# Patient Record
Sex: MALE
[De-identification: names, ages, dates, MRNs, and addresses within clinical notes are randomized per-mention and may not be internally consistent; named-entity substitution may affect disease eponyms.]

---

## 2017-07-14 ENCOUNTER — HOSPITAL ENCOUNTER (OUTPATIENT)
Dept: HOSPITAL 76 - LAB.N | Age: 37
Discharge: HOME | End: 2017-07-14
Attending: NURSE PRACTITIONER
Payer: COMMERCIAL

## 2017-07-14 DIAGNOSIS — N52.9: ICD-10-CM

## 2017-07-14 DIAGNOSIS — R68.82: ICD-10-CM

## 2017-07-14 DIAGNOSIS — E78.5: Primary | ICD-10-CM

## 2017-07-14 LAB
CHOLEST SERPL-MCNC: 172 MG/DL
HDLC SERPL-MCNC: 40 MG/DL
HDLC SERPL: 4.3 {RATIO} (ref ?–5)
LDLC/HDLC SERPL: 2.4 {RATIO} (ref ?–3.6)
TRIGL P FAST SERPL-MCNC: 176 MG/DL
VLDLC SERPL-SCNC: 35 MG/DL

## 2017-07-14 PROCEDURE — 36415 COLL VENOUS BLD VENIPUNCTURE: CPT

## 2017-07-14 PROCEDURE — 84403 ASSAY OF TOTAL TESTOSTERONE: CPT

## 2017-07-14 PROCEDURE — 80061 LIPID PANEL: CPT

## 2017-09-14 ENCOUNTER — HOSPITAL ENCOUNTER (OUTPATIENT)
Dept: HOSPITAL 76 - LAB.N | Age: 37
Discharge: HOME | End: 2017-09-14
Attending: NURSE PRACTITIONER
Payer: COMMERCIAL

## 2017-09-14 DIAGNOSIS — E29.1: Primary | ICD-10-CM

## 2017-09-14 PROCEDURE — 84270 ASSAY OF SEX HORMONE GLOBUL: CPT

## 2017-09-14 PROCEDURE — 84403 ASSAY OF TOTAL TESTOSTERONE: CPT

## 2017-09-14 PROCEDURE — 36415 COLL VENOUS BLD VENIPUNCTURE: CPT

## 2017-09-14 PROCEDURE — 82040 ASSAY OF SERUM ALBUMIN: CPT

## 2018-03-05 ENCOUNTER — HOSPITAL ENCOUNTER (OUTPATIENT)
Dept: HOSPITAL 76 - LAB.N | Age: 38
Discharge: HOME | End: 2018-03-05
Attending: NURSE PRACTITIONER
Payer: COMMERCIAL

## 2018-03-05 DIAGNOSIS — E29.1: Primary | ICD-10-CM

## 2018-03-05 PROCEDURE — 36415 COLL VENOUS BLD VENIPUNCTURE: CPT

## 2018-03-05 PROCEDURE — 84403 ASSAY OF TOTAL TESTOSTERONE: CPT

## 2021-02-22 ENCOUNTER — HOSPITAL ENCOUNTER (OUTPATIENT)
Dept: HOSPITAL 76 - SC | Age: 41
Discharge: HOME | End: 2021-02-22
Attending: INTERNAL MEDICINE
Payer: COMMERCIAL

## 2021-02-22 VITALS — DIASTOLIC BLOOD PRESSURE: 73 MMHG | SYSTOLIC BLOOD PRESSURE: 124 MMHG

## 2021-02-22 DIAGNOSIS — R06.81: ICD-10-CM

## 2021-02-22 DIAGNOSIS — G47.8: ICD-10-CM

## 2021-02-22 DIAGNOSIS — E66.9: ICD-10-CM

## 2021-02-22 DIAGNOSIS — G47.10: ICD-10-CM

## 2021-02-22 DIAGNOSIS — R06.83: Primary | ICD-10-CM

## 2021-02-22 PROCEDURE — 99212 OFFICE O/P EST SF 10 MIN: CPT

## 2021-02-22 PROCEDURE — 99202 OFFICE O/P NEW SF 15 MIN: CPT

## 2021-02-22 NOTE — SLEEP CARE CONSULTATION
Information from patient questionnaire entered by Ronit Fitzpatrick.





I have reviewed and concur with the information entered by Ronit Fitzpatrick. This 

document represents the service I personally performed and the decisions made by

me, Tristin Poon MD, [facility] [place] [facility].





History of Present Illness


Service Date and Time: 02/22/2021    1442


Reason for Visit: New patient, [facility] care


Chief Complaint: reports: Unrefreshed sleep, Snoring, Observed pauses in 

breathing (Long pauses of breath with groaning loud noises), Fatigue


Date of Onset: 12 years


Usual bedtime: 10 PM


Time it takes to fall asleep: 10 minutes


Snores at night: Yes (but not all the time)


Observed to quit breathing while asleep: Yes


Sleeps alone due to snoring: No


Number of times waking at night: 2 - 3 times


Reasons for waking at night: reports: Pain (Sometimes wrist pain), Other 

(Holding my breath)


Toss, Turn, or Twitch while sleeping: Yes


Recalls having dreams: No


Usually gets out of bed at: 530 AM


Feels refreshed in the morning: No


Morning headache: No


Sleepy or fatigued during the day: Yes


Ever fallen asleep while driving: No


Takes day naps: No


Dreams during day naps: No


Prior sleep studies: Yes


Year and Where: 2011 [facility]


Additional HPI information: 


I had the pleasure of seeing Mr. Bray again after 10 years.  Back then he 

complained of loud snore and witnessed apneas.  His in-laboratory 

polysomnography was negative for significant sleep disordered breathing.  The 

AHI was only 0.7.  He tells me today that he now snores louder and his has 

observed him quit breathing more frequently.  He wakes himself up from his own 

snore.  During the day he feels tired and sleep.  His [place] Sleepiness Scale 

score is 3.  His weight has not changed much but he has developed hypertension.








- Parasomnia Symptoms


Ever been unable to move upon waking from sleep: No


Bothered by creepy, crawly, restless sensations in legs: No


Problems with memory or concentration: Yes





Subjective


Initial [place] Sleepiness Scale score: 3 (in 2021 (9 in 2011)





Past Medical History


Past Medical History: reports: Hypertension, Impotence, Other (High cholesterol)





Social History


The patient's occupation is an . Patient is  and lives in 

[place]. 





Have you smoked in the past 12 months: Yes


Cigarettes per day (20/pack): 20


Years of smoking: 15


Quit date: 4/2014


Smoking Pack Years: 15.0


Alcohol use: Yes


Alcohol amount and frequency: 2 glasses wine/night


Caffeine use: Yes


Caffeine amount and frequency: 2 cups





Family History


Family history of sleep disordered breathing: No (Not sure)





Allergies and Home Medications


Drug allergies reviewed: Yes


Home medication list reviewed: Yes





Review of Systems


Weight gain over past 5 years: 30


Weight loss over past 5 years: 40


Cardiovascular: reports: high blood pressure


Respiratory: denies: shortness of breath, wheeze, sputum production, chronic 

cough, other


Gastrointestinal: denies: heartburn, difficulty swallowing, nausea, vomitting, 

diarrhea, abdominal pain, other


Urinary: reports: impotence


Neurological: denies: headaches, seizure, head trauma, disorientation, speech 

dysfunction, gait or balance problems, fainting or unconsciousness, other


Psychiatric: denies: Attention Deficit Hyperactivity, anxiety, depression, mood 

disorder, claustrophobia, other


Ear/Nose/Throat: reports: nose bleeds, wisdom teeth removed


Endocrine: reports: sluggishness (tired)


Musculoskeletal: denies: joint pain, neck pain, back pain, joint swelling, 

muscle pain or cramping, mobility problems, other


Immunologic: denies: sneezing, rash, itching, allergies to food or environment, 

other





Physical Exam


Vital signs obtained and entered by: Dr. Poon


Blood Pressure: 124/73


Cuff size: regular


Heart Rate: 83


O2 Saturation: 97


Height: 6 ft 3 in


Weight: 250 lb


Body Mass Index: 31.2


BMI Classification: Obese


Neck circumference: 17.5


Mood/affect: normal


HEENT: No craniofacial malformation


Nostrils: patent to airflow


Turbinates: normal


Septum: midline


Mouth and throat: narrow oropharynx


Soft palate: long


Hard palate: normal


Uvula: normal


Uvula visualization: 50% Mallampati Class II


Tongue: normal in size


Tonsils: small


Chin and jaw: normal size and position


Neck: normal w/o lymphadenopathy or thyromegaly


Heart: regular rate and rhythm


Lungs: clear bilaterally


Neurologic: intact





Impression and Plan


IMPRESSION: 1. Obstructive Sleep Apnea-Hypopnea Syndrome, as suggested by 

history of loud and irregular snoring, observed cessation of breath while 

asleep, unrefreshed sleep, and daytime hypersomnolence. Narrow oropharynx and 

obesity are common predisposing factors for obstructive sleep apnea-hypopnea 

syndrome. Untreated obstructive sleep apnea can also cause hypertension.  I 

recommend proceeding to polysomnography to confirm the diagnosis and to assess 

severity. If he has significant sleep disordered breathing, a manual CPAP 

titration study will also be performed to find the optimal treatment pressure. I

informed the patient of what the sleep studies involve and after some 

discussion, he would like to first have a home sleep apnea test (HSAT). 





Plan:  1. Schedule a home sleep apnea test (HSAT).


   2. Avoid long distance driving or when feeling sleepy.


   3. Avoid alcohol, sedative and muscle relaxant around bedtime.


   4. Attempt to lose weight.


   5. Return for a follow up after the test.





Follow up recommended for: Weight management


Visit Type: In Office


Time Spent with Patient (minutes): 15


Provider Statement: I spent 100% of the Face to Face Visit with the patient with

greater than 50% spent counseling the patient and coordination of care.

## 2021-03-10 ENCOUNTER — HOSPITAL ENCOUNTER (OUTPATIENT)
Dept: HOSPITAL 76 - SC | Age: 41
Discharge: HOME | End: 2021-03-10
Attending: INTERNAL MEDICINE
Payer: COMMERCIAL

## 2021-03-10 DIAGNOSIS — E66.9: ICD-10-CM

## 2021-03-10 DIAGNOSIS — R06.83: Primary | ICD-10-CM

## 2021-03-10 PROCEDURE — 95806 SLEEP STUDY UNATT&RESP EFFT: CPT

## 2021-03-22 ENCOUNTER — HOSPITAL ENCOUNTER (OUTPATIENT)
Dept: HOSPITAL 76 - SC | Age: 41
Discharge: HOME | End: 2021-03-22
Attending: INTERNAL MEDICINE
Payer: COMMERCIAL

## 2021-03-22 DIAGNOSIS — E66.9: ICD-10-CM

## 2021-03-22 DIAGNOSIS — R06.83: Primary | ICD-10-CM

## 2021-03-22 PROCEDURE — 99212 OFFICE O/P EST SF 10 MIN: CPT

## 2021-03-22 NOTE — SLEEP CARE CONSULTATION
Information from patient questionnaire entered by Ronit Fitzpatrick.





I have reviewed and concur with the information entered by Ronit Fitzpatrick. This 

document represents the service I personally performed and the decisions made by

me, Tristin Poon MD, [facility].





History of Present Illness


Service Date and Time: 03/22/2021    1454


Initial [place] Sleepiness Scale score: 3 (in 2021 (9 in 2011)


Current [place] Sleepiness Scale score: 6


Additional HPI information: 


HPI:  Mr. Bray returned for follow up of the home sleep apnea test (HSAT) he 

had on 3/10/21.  The test showed no significant sleep-disordered breathing with 

an AHI of 2.2 and manuel oxygen saturation of 85%.  The patient slept almost 

equally in supine and non-supine positions.





The patient was informed of these findings.  I explained to him that the test 

was normal.  The result is consistent with his in-laboratory polysomnography 

performed here 10 years ago.








Sleep Study





- Results


Type of Sleep Study: Home sleep study


Prior sleep studies: Yes


Year and Where: 2011 [facility]





Allergies and Home Medications


Drug allergies reviewed: Yes


Home medication list reviewed: Yes





Review of Systems


Review of systems same as previous: Yes





Physical Exam


Height: 6 ft 3 in


Weight: 250 lb


Body Mass Index: 31.2


BMI Classification: Obese





Impression and Plan


IMPRESSION: 1. Primary Snore (ICD-10 R06.83), but without significant sleep 

disordered breathing.  The patient had the test because he wife saw him quit 

breathing at night.  





PLAN:     1.   Avoid weight gain.


2.   Return for a follow up on as needed basis.





Follow up recommended for: Weight management


Visit Type: In Office


Time Spent with Patient (minutes): 15


Provider Statement: I spent 100% of the Face to Face Visit with the patient with

greater than 50% spent counseling the patient and coordination of care.